# Patient Record
Sex: FEMALE | Race: ASIAN | URBAN - METROPOLITAN AREA
[De-identification: names, ages, dates, MRNs, and addresses within clinical notes are randomized per-mention and may not be internally consistent; named-entity substitution may affect disease eponyms.]

---

## 2021-08-28 ENCOUNTER — TELEPHONE ENCOUNTER (OUTPATIENT)
Dept: URBAN - METROPOLITAN AREA CLINIC 13 | Facility: CLINIC | Age: 86
End: 2021-08-28

## 2021-08-29 ENCOUNTER — TELEPHONE ENCOUNTER (OUTPATIENT)
Dept: URBAN - METROPOLITAN AREA CLINIC 13 | Facility: CLINIC | Age: 86
End: 2021-08-29

## 2024-03-07 ENCOUNTER — OV NP (OUTPATIENT)
Dept: URBAN - METROPOLITAN AREA CLINIC 46 | Facility: CLINIC | Age: 89
End: 2024-03-07
Payer: MEDICARE

## 2024-03-07 VITALS
TEMPERATURE: 97.5 F | DIASTOLIC BLOOD PRESSURE: 75 MMHG | SYSTOLIC BLOOD PRESSURE: 187 MMHG | WEIGHT: 176.8 LBS | BODY MASS INDEX: 32.54 KG/M2 | HEIGHT: 62 IN | HEART RATE: 68 BPM

## 2024-03-07 DIAGNOSIS — R13.13 DYSPHAGIA, PHARYNGEAL PHASE: ICD-10-CM

## 2024-03-07 DIAGNOSIS — K21.9 GERD: ICD-10-CM

## 2024-03-07 PROCEDURE — 99204 OFFICE O/P NEW MOD 45 MIN: CPT | Performed by: INTERNAL MEDICINE

## 2024-03-07 RX ORDER — FLUTICASONE PROPIONATE 50 UG/1
1 SPRAY IN EACH NOSTRIL SPRAY, METERED NASAL ONCE A DAY
Status: ACTIVE | COMMUNITY

## 2024-03-07 RX ORDER — HYDRALAZINE HYDROCHLORIDE 50 MG/1
1 TABLET WITH FOOD TABLET ORAL THREE TIMES A DAY
Qty: 270 TABLET | Status: ACTIVE | COMMUNITY

## 2024-03-07 RX ORDER — CALCIUM/MAGNESIUM/ZINC 333-133 MG
AS DIRECTED TABLET ORAL
Status: ACTIVE | COMMUNITY

## 2024-03-07 RX ORDER — LEVOCETIRIZINE DIHYDROCHLORIDE 5 MG/1
1 TABLET IN THE EVENING TABLET ORAL ONCE A DAY
Qty: 90 TABLET | Status: ACTIVE | COMMUNITY

## 2024-03-07 RX ORDER — ASCORBIC ACID 1000 MG
1 TABLET TABLET ORAL ONCE A DAY
Status: ACTIVE | COMMUNITY

## 2024-03-07 RX ORDER — DABIGATRAN ETEXILATE 75 MG/1
2 CAPSULES CAPSULE ORAL TWICE A DAY
Qty: 360 CAPSULE | Status: ACTIVE | COMMUNITY

## 2024-03-07 RX ORDER — PANTOPRAZOLE SODIUM 40 MG/1
1 TABLET TABLET, DELAYED RELEASE ORAL ONCE A DAY
Qty: 90 TABLET | Status: ACTIVE | COMMUNITY

## 2024-03-07 RX ORDER — CHROMIUM 200 MCG
1 TABLET TABLET ORAL ONCE A DAY
Status: ACTIVE | COMMUNITY

## 2024-03-07 RX ORDER — AMLODIPINE BESYLATE 10 MG/1
1 TABLET TABLET ORAL ONCE A DAY
Qty: 90 TABLET | Status: ACTIVE | COMMUNITY

## 2024-03-07 RX ORDER — ALENDRONATE SODIUM 70 MG/1
1 TABLET 30 MINUTES BEFORE THE FIRST FOOD, BEVERAGE OR MEDICINE OF THE DAY WITH PLAIN WATER TABLET ORAL
Status: ACTIVE | COMMUNITY

## 2024-03-07 NOTE — HPI-TODAY'S VISIT:
88-year-old female is here complaining of dysphagia.  She reports her symptoms have been going on for the past 2 to 3 months.  Her symptoms are with solids and liquids.  The patient does have a history of aortic valve stenosis.  She has been placed on pantoprazole 40 mg daily and continues to have symptoms.  There is no nausea vomiting or weight loss.

## 2024-03-08 ENCOUNTER — LAB (OUTPATIENT)
Dept: URBAN - METROPOLITAN AREA CLINIC 46 | Facility: CLINIC | Age: 89
End: 2024-03-08

## 2024-05-30 ENCOUNTER — TELEPHONE ENCOUNTER (OUTPATIENT)
Dept: URBAN - METROPOLITAN AREA CLINIC 46 | Facility: CLINIC | Age: 89
End: 2024-05-30

## 2024-05-30 RX ORDER — PANTOPRAZOLE SODIUM 40 MG/1
1 TABLET TABLET, DELAYED RELEASE ORAL ONCE A DAY
Qty: 90 TABLET | Refills: 3

## 2024-06-25 ENCOUNTER — DASHBOARD ENCOUNTERS (OUTPATIENT)
Age: 89
End: 2024-06-25

## 2024-06-27 PROBLEM — 235595009: Status: ACTIVE | Noted: 2024-06-27

## 2024-06-28 ENCOUNTER — LAB OUTSIDE AN ENCOUNTER (OUTPATIENT)
Dept: URBAN - METROPOLITAN AREA CLINIC 46 | Facility: CLINIC | Age: 89
End: 2024-06-28

## 2024-06-28 ENCOUNTER — TELEPHONE ENCOUNTER (OUTPATIENT)
Dept: URBAN - METROPOLITAN AREA CLINIC 44 | Facility: CLINIC | Age: 89
End: 2024-06-28

## 2024-06-28 ENCOUNTER — OFFICE VISIT (OUTPATIENT)
Dept: URBAN - METROPOLITAN AREA CLINIC 46 | Facility: CLINIC | Age: 89
End: 2024-06-28
Payer: MEDICARE

## 2024-06-28 VITALS
HEIGHT: 62 IN | DIASTOLIC BLOOD PRESSURE: 96 MMHG | WEIGHT: 169 LBS | HEART RATE: 84 BPM | SYSTOLIC BLOOD PRESSURE: 183 MMHG | BODY MASS INDEX: 31.1 KG/M2 | TEMPERATURE: 97.7 F

## 2024-06-28 DIAGNOSIS — K21.9 GASTROESOPHAGEAL REFLUX DISEASE, UNSPECIFIED WHETHER ESOPHAGITIS PRESENT: ICD-10-CM

## 2024-06-28 DIAGNOSIS — R13.19 ESOPHAGEAL DYSPHAGIA: ICD-10-CM

## 2024-06-28 PROCEDURE — 99214 OFFICE O/P EST MOD 30 MIN: CPT

## 2024-06-28 RX ORDER — HYDRALAZINE HYDROCHLORIDE 50 MG/1
1 TABLET WITH FOOD TABLET ORAL THREE TIMES A DAY
Qty: 270 TABLET | Status: ACTIVE | COMMUNITY

## 2024-06-28 RX ORDER — ASCORBIC ACID 1000 MG
1 TABLET TABLET ORAL ONCE A DAY
Status: ACTIVE | COMMUNITY

## 2024-06-28 RX ORDER — PANTOPRAZOLE SODIUM 40 MG/1
1 TABLET TABLET, DELAYED RELEASE ORAL ONCE A DAY
Qty: 90 TABLET | Refills: 3 | Status: DISCONTINUED | COMMUNITY

## 2024-06-28 RX ORDER — AMLODIPINE BESYLATE 10 MG/1
1 TABLET TABLET ORAL ONCE A DAY
Qty: 90 TABLET | Status: ACTIVE | COMMUNITY

## 2024-06-28 RX ORDER — FLUTICASONE PROPIONATE 50 UG/1
1 SPRAY IN EACH NOSTRIL SPRAY, METERED NASAL ONCE A DAY
Status: ACTIVE | COMMUNITY

## 2024-06-28 RX ORDER — ALENDRONATE SODIUM 70 MG/1
1 TABLET 30 MINUTES BEFORE THE FIRST FOOD, BEVERAGE OR MEDICINE OF THE DAY WITH PLAIN WATER TABLET ORAL
Status: ACTIVE | COMMUNITY

## 2024-06-28 RX ORDER — CHROMIUM 200 MCG
1 TABLET TABLET ORAL ONCE A DAY
Status: ACTIVE | COMMUNITY

## 2024-06-28 RX ORDER — ECHINACEA 400 MG
AS DIRECTED CAPSULE ORAL
Status: ACTIVE | COMMUNITY

## 2024-06-28 RX ORDER — PANTOPRAZOLE SODIUM 40 MG/1
1 TABLET TABLET, DELAYED RELEASE ORAL
Qty: 90 | Refills: 3 | OUTPATIENT
Start: 2024-06-28

## 2024-06-28 NOTE — HPI-TODAY'S VISIT:
89-year-old female presents for follow-up after barium swallow.  Last OV 3/2024 for GERD, dysphagia. Barium swallow 4/26/2024 with moderate sized hiatal hernia, nonspecific esophageal dysmotility, short segment area of mild mucosal irregularity concerning for small ulcerations in distal esophagus just above GEJ, without significant stricture.  Patient presents alone today, and appears to have some issues with memory which complicate history. She is reporting that she has dysphagia to pills on some days, and also has difficulty eating certain foods such as rice. She states she occasionally vomits in the am, w/o hematemesis, but this has not happened in "some time". Denies heartburn symptoms on current regimen of daily protonix. She has lost 8 lbs in the past 1-2 mos, but reports this was due to eating less food on recent vacation. Denies odynophagia and abd pain. No melena.  EKG in February 2024 with atrial paced rhythm, right bundle branch block, bifascicular block, age-indeterminate septal infarct. Echo with grade 1 diastolic dysfunction, normal EF. She has pacemaker, with cardiac device check in 2/2024, and is followed by cardiologist Dr. Lema. Denies lung or kidney issues. No home O2, dialysis. No blood thinner use, and she is not diabetic.  History iron deficiency anemia, with negative PillCam in 2015. Last EGD/colon in 8/2015 showed hiatal hernia, large hemorrhoid. No recent labs.

## 2024-06-28 NOTE — PHYSICAL EXAM CONSTITUTIONAL:
well developed, well nourished , in no acute distress , ambulating with cane , normal communication ability but mild memory deficits

## 2024-07-01 ENCOUNTER — TELEPHONE ENCOUNTER (OUTPATIENT)
Dept: URBAN - METROPOLITAN AREA CLINIC 44 | Facility: CLINIC | Age: 89
End: 2024-07-01

## 2024-08-21 ENCOUNTER — OFFICE VISIT (OUTPATIENT)
Dept: URBAN - METROPOLITAN AREA CLINIC 48 | Facility: CLINIC | Age: 89
End: 2024-08-21

## 2024-08-21 RX ORDER — AMLODIPINE BESYLATE 10 MG/1
1 TABLET TABLET ORAL ONCE A DAY
Qty: 90 TABLET | Status: ACTIVE | COMMUNITY

## 2024-08-21 RX ORDER — PANTOPRAZOLE SODIUM 40 MG/1
1 TABLET TABLET, DELAYED RELEASE ORAL
Qty: 90 | Refills: 3 | Status: ACTIVE | COMMUNITY
Start: 2024-06-28

## 2024-08-21 RX ORDER — FLUTICASONE PROPIONATE 50 UG/1
1 SPRAY IN EACH NOSTRIL SPRAY, METERED NASAL ONCE A DAY
Status: ACTIVE | COMMUNITY

## 2024-08-21 RX ORDER — CHROMIUM 200 MCG
1 TABLET TABLET ORAL ONCE A DAY
Status: ACTIVE | COMMUNITY

## 2024-08-21 RX ORDER — ECHINACEA 400 MG
AS DIRECTED CAPSULE ORAL
Status: ACTIVE | COMMUNITY

## 2024-08-21 RX ORDER — ESOMEPRAZOLE MAGNESIUM 40 MG/1
1 CAPSULE CAPSULE, DELAYED RELEASE PELLETS ORAL
Qty: 90 | Refills: 3 | Status: ACTIVE | COMMUNITY
Start: 2024-07-01

## 2024-08-21 RX ORDER — ASCORBIC ACID 1000 MG
1 TABLET TABLET ORAL ONCE A DAY
Status: ACTIVE | COMMUNITY

## 2024-08-21 RX ORDER — HYDRALAZINE HYDROCHLORIDE 50 MG/1
1 TABLET WITH FOOD TABLET ORAL THREE TIMES A DAY
Qty: 270 TABLET | Status: ACTIVE | COMMUNITY

## 2024-08-21 RX ORDER — ALENDRONATE SODIUM 70 MG/1
1 TABLET 30 MINUTES BEFORE THE FIRST FOOD, BEVERAGE OR MEDICINE OF THE DAY WITH PLAIN WATER TABLET ORAL
Status: ACTIVE | COMMUNITY

## 2024-08-21 NOTE — HPI-TODAY'S VISIT:
89-year-old female presents for follow-up after barium swallow.  Last OV 3/2024 for GERD, dysphagia. Barium swallow 4/26/2024 with moderate sized hiatal hernia, nonspecific esophageal dysmotility, short segment area of mild mucosal irregularity concerning for small ulcerations in distal esophagus just above GEJ, without significant stricture.  Patient presents alone today, and appears to have some issues with memory which complicate history. She is reporting that she has dysphagia to pills on some days, and also has difficulty eating certain foods such as rice. She states she occasionally vomits in the am, w/o hematemesis, but this has not happened in "some time". Denies heartburn symptoms on current regimen of daily protonix. She has lost 8 lbs in the past 1-2 mos, but reports this was due to eating less food on recent vacation. Denies odynophagia and abd pain. No melena.  EKG in February 2024 with atrial paced rhythm, right bundle branch block, bifascicular block, age-indeterminate septal infarct. Echo with grade 1 diastolic dysfunction, normal EF. She has pacemaker, with cardiac device check in 2/2024, and is followed by cardiologist Dr. Lema. Denies lung or kidney issues. No home O2, dialysis. No blood thinner use, and she is not diabetic.  History iron deficiency anemia, with negative PillCam in 2015. Last EGD/colon in 8/2015 showed hiatal hernia, large hemorrhoid. No recent labs. 89-year-old female presents for follow-up GERD, dysphagia.  At last OV, reported dysphagia, and was deemed high risk for procedure.  Rx'd esomeprazole 40 mg daily.

## 2024-10-31 ENCOUNTER — OFFICE VISIT (OUTPATIENT)
Dept: URBAN - METROPOLITAN AREA CLINIC 46 | Facility: CLINIC | Age: 89
End: 2024-10-31

## 2024-10-31 RX ORDER — CHROMIUM 200 MCG
1 TABLET TABLET ORAL ONCE A DAY
Status: ACTIVE | COMMUNITY

## 2024-10-31 RX ORDER — FLUTICASONE PROPIONATE 50 UG/1
1 SPRAY IN EACH NOSTRIL SPRAY, METERED NASAL ONCE A DAY
Status: ACTIVE | COMMUNITY

## 2024-10-31 RX ORDER — PANTOPRAZOLE SODIUM 40 MG/1
1 TABLET TABLET, DELAYED RELEASE ORAL
Qty: 90 | Refills: 3 | Status: ACTIVE | COMMUNITY
Start: 2024-06-28

## 2024-10-31 RX ORDER — ESOMEPRAZOLE MAGNESIUM 40 MG/1
1 CAPSULE CAPSULE, DELAYED RELEASE PELLETS ORAL
Qty: 90 | Refills: 3 | Status: ACTIVE | COMMUNITY
Start: 2024-07-01

## 2024-10-31 RX ORDER — ECHINACEA 400 MG
AS DIRECTED CAPSULE ORAL
Status: ACTIVE | COMMUNITY

## 2024-10-31 RX ORDER — ASCORBIC ACID 1000 MG
1 TABLET TABLET ORAL ONCE A DAY
Status: ACTIVE | COMMUNITY

## 2024-10-31 RX ORDER — HYDRALAZINE HYDROCHLORIDE 50 MG/1
1 TABLET WITH FOOD TABLET ORAL THREE TIMES A DAY
Qty: 270 TABLET | Status: ACTIVE | COMMUNITY

## 2024-10-31 RX ORDER — ALENDRONATE SODIUM 70 MG/1
1 TABLET 30 MINUTES BEFORE THE FIRST FOOD, BEVERAGE OR MEDICINE OF THE DAY WITH PLAIN WATER TABLET ORAL
Status: ACTIVE | COMMUNITY

## 2024-10-31 RX ORDER — AMLODIPINE BESYLATE 10 MG/1
1 TABLET TABLET ORAL ONCE A DAY
Qty: 90 TABLET | Status: ACTIVE | COMMUNITY

## 2024-11-27 ENCOUNTER — OFFICE VISIT (OUTPATIENT)
Dept: URBAN - METROPOLITAN AREA CLINIC 48 | Facility: CLINIC | Age: 89
End: 2024-11-27

## 2024-11-27 RX ORDER — AMLODIPINE BESYLATE 10 MG/1
1 TABLET TABLET ORAL ONCE A DAY
Qty: 90 TABLET | Status: ACTIVE | COMMUNITY

## 2024-11-27 RX ORDER — ALENDRONATE SODIUM 70 MG/1
1 TABLET 30 MINUTES BEFORE THE FIRST FOOD, BEVERAGE OR MEDICINE OF THE DAY WITH PLAIN WATER TABLET ORAL
Status: ACTIVE | COMMUNITY

## 2024-11-27 RX ORDER — HYDRALAZINE HYDROCHLORIDE 50 MG/1
1 TABLET WITH FOOD TABLET ORAL THREE TIMES A DAY
Qty: 270 TABLET | Status: ACTIVE | COMMUNITY

## 2024-11-27 RX ORDER — ASCORBIC ACID 1000 MG
1 TABLET TABLET ORAL ONCE A DAY
Status: ACTIVE | COMMUNITY

## 2024-11-27 RX ORDER — ECHINACEA 400 MG
AS DIRECTED CAPSULE ORAL
Status: ACTIVE | COMMUNITY

## 2024-11-27 RX ORDER — FLUTICASONE PROPIONATE 50 UG/1
1 SPRAY IN EACH NOSTRIL SPRAY, METERED NASAL ONCE A DAY
Status: ACTIVE | COMMUNITY

## 2024-11-27 RX ORDER — CHROMIUM 200 MCG
1 TABLET TABLET ORAL ONCE A DAY
Status: ACTIVE | COMMUNITY

## 2024-11-27 RX ORDER — PANTOPRAZOLE SODIUM 40 MG/1
1 TABLET TABLET, DELAYED RELEASE ORAL
Qty: 90 | Refills: 3 | Status: ACTIVE | COMMUNITY
Start: 2024-06-28

## 2024-11-27 RX ORDER — ESOMEPRAZOLE MAGNESIUM 40 MG/1
1 CAPSULE CAPSULE, DELAYED RELEASE PELLETS ORAL
Qty: 90 | Refills: 3 | Status: ACTIVE | COMMUNITY
Start: 2024-07-01

## 2024-12-02 ENCOUNTER — OFFICE VISIT (OUTPATIENT)
Dept: URBAN - METROPOLITAN AREA CLINIC 48 | Facility: CLINIC | Age: 89
End: 2024-12-02
Payer: MEDICARE

## 2024-12-02 VITALS
HEIGHT: 62 IN | BODY MASS INDEX: 32.5 KG/M2 | SYSTOLIC BLOOD PRESSURE: 167 MMHG | DIASTOLIC BLOOD PRESSURE: 78 MMHG | OXYGEN SATURATION: 96 % | TEMPERATURE: 97.5 F | WEIGHT: 176.6 LBS | HEART RATE: 64 BPM

## 2024-12-02 DIAGNOSIS — R13.19 ESOPHAGEAL DYSPHAGIA: ICD-10-CM

## 2024-12-02 DIAGNOSIS — K21.9 GASTROESOPHAGEAL REFLUX DISEASE, UNSPECIFIED WHETHER ESOPHAGITIS PRESENT: ICD-10-CM

## 2024-12-02 PROCEDURE — 99213 OFFICE O/P EST LOW 20 MIN: CPT | Performed by: PHYSICIAN ASSISTANT

## 2024-12-02 RX ORDER — ESOMEPRAZOLE MAGNESIUM 40 MG/1
1 CAPSULE CAPSULE, DELAYED RELEASE PELLETS ORAL
Qty: 90 | Refills: 3 | Status: ON HOLD | COMMUNITY
Start: 2024-07-01

## 2024-12-02 RX ORDER — AMLODIPINE BESYLATE 10 MG/1
1 TABLET TABLET ORAL ONCE A DAY
Qty: 90 TABLET | Status: ACTIVE | COMMUNITY

## 2024-12-02 RX ORDER — HYDRALAZINE HYDROCHLORIDE 50 MG/1
1 TABLET WITH FOOD TABLET ORAL THREE TIMES A DAY
Qty: 270 TABLET | Status: ACTIVE | COMMUNITY

## 2024-12-02 RX ORDER — CHROMIUM 200 MCG
1 TABLET TABLET ORAL ONCE A DAY
Status: ACTIVE | COMMUNITY

## 2024-12-02 RX ORDER — PANTOPRAZOLE SODIUM 40 MG/1
1 TABLET TABLET, DELAYED RELEASE ORAL
OUTPATIENT
Start: 2024-06-28

## 2024-12-02 RX ORDER — ESOMEPRAZOLE MAGNESIUM 40 MG/1
1 CAPSULE CAPSULE, DELAYED RELEASE PELLETS ORAL
Qty: 90 | Refills: 2
Start: 2024-07-01

## 2024-12-02 RX ORDER — FLUTICASONE PROPIONATE 50 UG/1
1 SPRAY IN EACH NOSTRIL SPRAY, METERED NASAL ONCE A DAY
Status: ON HOLD | COMMUNITY

## 2024-12-02 RX ORDER — ASCORBIC ACID 1000 MG
1 TABLET TABLET ORAL ONCE A DAY
Status: ON HOLD | COMMUNITY

## 2024-12-02 RX ORDER — FERROUS SULFATE 325(65) MG
1 TABLET TABLET ORAL
Status: ACTIVE | COMMUNITY

## 2024-12-02 RX ORDER — ECHINACEA 400 MG
AS DIRECTED CAPSULE ORAL
Status: ACTIVE | COMMUNITY

## 2024-12-02 RX ORDER — DABIGATRAN ETEXILATE 75 MG/1
2 CAPSULES CAPSULE ORAL TWICE A DAY
Status: ACTIVE | COMMUNITY

## 2024-12-02 RX ORDER — PANTOPRAZOLE SODIUM 40 MG/1
1 TABLET TABLET, DELAYED RELEASE ORAL
Qty: 90 | Refills: 3 | Status: ACTIVE | COMMUNITY
Start: 2024-06-28

## 2024-12-02 RX ORDER — ALENDRONATE SODIUM 70 MG/1
1 TABLET 30 MINUTES BEFORE THE FIRST FOOD, BEVERAGE OR MEDICINE OF THE DAY WITH PLAIN WATER TABLET ORAL
Status: ON HOLD | COMMUNITY

## 2024-12-02 NOTE — HPI-TODAY'S VISIT:
89-year-old female presents for follow-up GERD, dysphagia. At last OV, reported dysphagia, and was deemed high risk for procedure. Rx'd esomeprazole 40 mg daily. She nver got the new Rx and has still been on Pantoprazole daily. She has occasional reflux and occasional episodes of dysphagia/regurgitation which has been unchanged for many years. Overall she feels well. She has been on long term Alendronate. She has gained 7-8 pounds since her last OV 10/1/24.  She did recenetly see cardiology, Dr. Lema. 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   Prior history:  She was seen at the beginning of October after barium swallow.  Last OV 3/2024 for GERD, dysphagia. Barium swallow 4/26/2024 with moderate sized hiatal hernia, nonspecific esophageal dysmotility, short segment area of mild mucosal irregularity concerning for small ulcerations in distal esophagus just above GEJ, without significant stricture.  When seen 10/1/24: Patient presents alone today, and appears to have some issues with memory which complicate history. She is reporting that she has dysphagia to pills on some days, and also has difficulty eating certain foods such as rice. She states she occasionally vomits in the am, w/o hematemesis, but this has not happened in "some time". Denies heartburn symptoms on current regimen of daily protonix. She has lost 8 lbs in the past 1-2 mos, but reports this was due to eating less food on recent vacation. Denies odynophagia and abd pain. No melena.  EKG in February 2024 with atrial paced rhythm, right bundle branch block, bifascicular block, age-indeterminate septal infarct. Echo with grade 1 diastolic dysfunction, normal EF. She has pacemaker, with cardiac device check in 2/2024, and is followed by cardiologist Dr. Lema. Denies lung or kidney issues. No home O2, dialysis. No blood thinner use, and she is not diabetic.  History iron deficiency anemia, with negative PillCam in 2015. Last EGD/colon in 8/2015 showed hiatal hernia, large hemorrhoid. No recent labs.

## 2025-03-13 ENCOUNTER — OFFICE VISIT (OUTPATIENT)
Dept: URBAN - METROPOLITAN AREA CLINIC 46 | Facility: CLINIC | Age: OVER 89
End: 2025-03-13

## 2025-03-25 ENCOUNTER — OFFICE VISIT (OUTPATIENT)
Dept: URBAN - METROPOLITAN AREA CLINIC 48 | Facility: CLINIC | Age: OVER 89
End: 2025-03-25
Payer: MEDICARE

## 2025-03-25 DIAGNOSIS — K21.9 GASTROESOPHAGEAL REFLUX DISEASE, UNSPECIFIED WHETHER ESOPHAGITIS PRESENT: ICD-10-CM

## 2025-03-25 DIAGNOSIS — R13.19 ESOPHAGEAL DYSPHAGIA: ICD-10-CM

## 2025-03-25 PROCEDURE — 99213 OFFICE O/P EST LOW 20 MIN: CPT | Performed by: PHYSICIAN ASSISTANT

## 2025-03-25 RX ORDER — AMLODIPINE BESYLATE 10 MG/1
1 TABLET TABLET ORAL ONCE A DAY
Qty: 90 TABLET | Status: ACTIVE | COMMUNITY

## 2025-03-25 RX ORDER — ASCORBIC ACID 1000 MG
1 TABLET TABLET ORAL ONCE A DAY
Status: ACTIVE | COMMUNITY

## 2025-03-25 RX ORDER — ESOMEPRAZOLE MAGNESIUM 40 MG/1
1 CAPSULE CAPSULE, DELAYED RELEASE PELLETS ORAL
Qty: 90 | Refills: 2 | Status: ACTIVE | COMMUNITY
Start: 2024-07-01

## 2025-03-25 RX ORDER — FLUTICASONE PROPIONATE 50 UG/1
1 SPRAY IN EACH NOSTRIL SPRAY, METERED NASAL ONCE A DAY
Status: ACTIVE | COMMUNITY

## 2025-03-25 RX ORDER — CHROMIUM 200 MCG
1 TABLET TABLET ORAL ONCE A DAY
Status: ACTIVE | COMMUNITY

## 2025-03-25 RX ORDER — ESOMEPRAZOLE MAGNESIUM 40 MG/1
1 CAPSULE CAPSULE, DELAYED RELEASE PELLETS ORAL
OUTPATIENT
Start: 2024-07-01

## 2025-03-25 RX ORDER — DABIGATRAN ETEXILATE 75 MG/1
2 CAPSULES CAPSULE ORAL TWICE A DAY
Status: ACTIVE | COMMUNITY

## 2025-03-25 RX ORDER — FERROUS SULFATE 325(65) MG
1 TABLET TABLET ORAL
Status: ACTIVE | COMMUNITY

## 2025-03-25 RX ORDER — ECHINACEA 400 MG
AS DIRECTED CAPSULE ORAL
Status: ACTIVE | COMMUNITY

## 2025-03-25 RX ORDER — HYDRALAZINE HYDROCHLORIDE 50 MG/1
1 TABLET WITH FOOD TABLET ORAL THREE TIMES A DAY
Qty: 270 TABLET | Status: ACTIVE | COMMUNITY

## 2025-03-25 RX ORDER — ALENDRONATE SODIUM 70 MG/1
1 TABLET 30 MINUTES BEFORE THE FIRST FOOD, BEVERAGE OR MEDICINE OF THE DAY WITH PLAIN WATER TABLET ORAL
Status: ACTIVE | COMMUNITY

## 2025-03-25 NOTE — HPI-TODAY'S VISIT:
89-year-old female presents for follow-up GERD, dysphagia. She previously had c/o dysphagia, and was deemed high risk for procedure. Rx'd esomeprazole 40 mg daily. She never got the new Rx and had still been on Pantoprazole daily when she was last seen in December. We re-sent Rx for Esomeprazole which she has been taking daily. Today, she reports very occasional break through reflux, and no dysphagia. She feels this medication is better controlling her symptoms, which have been ongoing unchaged for several years until recent improvement with change in PPI. Overall she feels well. She has been on long term Alendronate.   She did recenetly see cardiology, Dr. Lema. 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   Prior history:  She was seen at the beginning of October after barium swallow.  Last OV 3/2024 for GERD, dysphagia. Barium swallow 4/26/2024 with moderate sized hiatal hernia, nonspecific esophageal dysmotility, short segment area of mild mucosal irregularity concerning for small ulcerations in distal esophagus just above GEJ, without significant stricture.  When seen 10/1/24: Patient presents alone today, and appears to have some issues with memory which complicate history. She is reporting that she has dysphagia to pills on some days, and also has difficulty eating certain foods such as rice. She states she occasionally vomits in the am, w/o hematemesis, but this has not happened in "some time". Denies heartburn symptoms on current regimen of daily protonix. She has lost 8 lbs in the past 1-2 mos, but reports this was due to eating less food on recent vacation. Denies odynophagia and abd pain. No melena.  EKG in February 2024 with atrial paced rhythm, right bundle branch block, bifascicular block, age-indeterminate septal infarct. Echo with grade 1 diastolic dysfunction, normal EF. She has pacemaker, with cardiac device check in 2/2024, and is followed by cardiologist Dr. Lema. Denies lung or kidney issues. No home O2, dialysis. No blood thinner use, and she is not diabetic.  History iron deficiency anemia, with negative PillCam in 2015. Last EGD/colon in 8/2015 showed hiatal hernia, large hemorrhoid. No recent labs.

## 2025-07-14 ENCOUNTER — ERX REFILL RESPONSE (OUTPATIENT)
Dept: URBAN - METROPOLITAN AREA CLINIC 44 | Facility: CLINIC | Age: OVER 89
End: 2025-07-14

## 2025-07-14 RX ORDER — ESOMEPRAZOLE MAGNESIUM 40 MG/1
TAKE 1 CAPSULE ONE TIME DAILY BEFORE FIRST MEAL CAPSULE, DELAYED RELEASE ORAL
Qty: 90 CAPSULE | Refills: 3